# Patient Record
Sex: MALE | Race: WHITE | NOT HISPANIC OR LATINO | Employment: FULL TIME | URBAN - METROPOLITAN AREA
[De-identification: names, ages, dates, MRNs, and addresses within clinical notes are randomized per-mention and may not be internally consistent; named-entity substitution may affect disease eponyms.]

---

## 2023-03-28 DIAGNOSIS — F41.8 SITUATIONAL ANXIETY: Primary | ICD-10-CM

## 2023-03-28 RX ORDER — ESCITALOPRAM OXALATE 10 MG/1
10 TABLET ORAL DAILY
COMMUNITY
End: 2023-03-28 | Stop reason: SDUPTHER

## 2023-03-28 RX ORDER — ESCITALOPRAM OXALATE 10 MG/1
10 TABLET ORAL DAILY
Qty: 30 TABLET | Refills: 3 | Status: SHIPPED | OUTPATIENT
Start: 2023-03-28 | End: 2023-09-01 | Stop reason: SDUPTHER

## 2023-04-05 PROBLEM — K59.09 CHRONIC CONSTIPATION: Status: ACTIVE | Noted: 2023-04-05

## 2023-04-05 PROBLEM — K21.9 ESOPHAGEAL REFLUX: Status: ACTIVE | Noted: 2023-04-05

## 2023-04-05 PROBLEM — R14.0 ABDOMINAL BLOATING: Status: ACTIVE | Noted: 2023-04-05

## 2023-04-05 PROBLEM — R10.13 EPIGASTRIC PAIN: Status: ACTIVE | Noted: 2023-04-05

## 2023-04-05 PROBLEM — J30.9 ALLERGIC RHINITIS: Status: ACTIVE | Noted: 2023-04-05

## 2023-04-05 PROBLEM — A60.00 GENITAL HERPES: Status: ACTIVE | Noted: 2023-04-05

## 2023-04-05 PROBLEM — F41.9 ANXIETY: Status: ACTIVE | Noted: 2023-04-05

## 2023-04-05 PROBLEM — L60.0 INGROWN TOENAIL: Status: ACTIVE | Noted: 2023-04-05

## 2023-04-05 PROBLEM — M25.569 KNEE PAIN: Status: ACTIVE | Noted: 2023-04-05

## 2023-04-05 PROBLEM — I49.1 PAC (PREMATURE ATRIAL CONTRACTION): Status: ACTIVE | Noted: 2023-04-05

## 2023-04-12 ENCOUNTER — OFFICE VISIT (OUTPATIENT)
Dept: PRIMARY CARE | Facility: CLINIC | Age: 23
End: 2023-04-12
Payer: COMMERCIAL

## 2023-04-12 VITALS
RESPIRATION RATE: 16 BRPM | OXYGEN SATURATION: 97 % | HEART RATE: 68 BPM | BODY MASS INDEX: 28.56 KG/M2 | TEMPERATURE: 98 F | WEIGHT: 182 LBS | HEIGHT: 67 IN | SYSTOLIC BLOOD PRESSURE: 118 MMHG | DIASTOLIC BLOOD PRESSURE: 70 MMHG

## 2023-04-12 DIAGNOSIS — A60.01 HERPES SIMPLEX INFECTION OF PENIS: ICD-10-CM

## 2023-04-12 DIAGNOSIS — L98.8 FISTULA: Primary | ICD-10-CM

## 2023-04-12 PROCEDURE — 99395 PREV VISIT EST AGE 18-39: CPT | Performed by: INTERNAL MEDICINE

## 2023-04-12 RX ORDER — OMEPRAZOLE 40 MG/1
40 CAPSULE, DELAYED RELEASE ORAL
COMMUNITY
Start: 2022-09-21 | End: 2023-04-12 | Stop reason: ALTCHOICE

## 2023-04-12 RX ORDER — VALACYCLOVIR HYDROCHLORIDE 500 MG/1
500 TABLET, FILM COATED ORAL 2 TIMES DAILY
Qty: 6 TABLET | Refills: 5 | Status: SHIPPED | OUTPATIENT
Start: 2023-04-12 | End: 2023-04-15

## 2023-04-12 ASSESSMENT — ENCOUNTER SYMPTOMS
CONSTIPATION: 1
SLEEP DISTURBANCE: 0
NERVOUS/ANXIOUS: 1
ABDOMINAL PAIN: 0
SHORTNESS OF BREATH: 0

## 2023-04-12 ASSESSMENT — PATIENT HEALTH QUESTIONNAIRE - PHQ9
1. LITTLE INTEREST OR PLEASURE IN DOING THINGS: NOT AT ALL
SUM OF ALL RESPONSES TO PHQ9 QUESTIONS 1 AND 2: 0
2. FEELING DOWN, DEPRESSED OR HOPELESS: NOT AT ALL

## 2023-04-12 NOTE — PROGRESS NOTES
Patient here for a physical - declined chaperone     Subjective   Patient ID: Ranjit Vaughan is a 23 y.o. male who presents for Annual Exam. He is generally doing well today.    The patient notes that he has been taking escitalopram 10mg every day and believes that may be why he continues to experience occasional anxiety.  He normally takes his medication around 3:30 pm which has been working for him.      The patient mentions experiencing a recent outbreak of genital herpes which resolved after using Femiclear over the counter.  His last episode occurred in 2019.      The patient reports that his diet has improved, and the heartburn has since subsided.  He has discontinued taking the omeprazole 40mg every day.  The patient is following with Yulissa KEARNEY from Gastroenterology.      The patient mentions a small opening near the anus that occasionally produces discharge, and is tender with prolonged sitting.  He is following with Gastroenterology and was advised to try Miralax for occasional gastrointestinal symptoms, and plans to do so soon.     The patient does not smoke cigarettes or use vaping products.    The patient notes mild unilateral hearing impairment, but does not believe that Audiometry is necessary as yet.  He denies any vision changes, and follows up regularly with a Dentist.  He notes good sleep quality.  He denies any dyspnea, abdominal pain, testicular pain, testicular masses, or hernia.          Review of Systems   HENT:  Positive for hearing loss.    Eyes:  Negative for visual disturbance.   Respiratory:  Negative for shortness of breath.    Gastrointestinal:  Positive for constipation. Negative for abdominal pain.        Positive for anal fistula.   Genitourinary:  Negative for scrotal swelling and testicular pain.   Skin:         Positive for genital herpes.   Psychiatric/Behavioral:  Negative for sleep disturbance. The patient is nervous/anxious.      Objective   Physical  Exam  Constitutional:       Appearance: Normal appearance.   Cardiovascular:      Rate and Rhythm: Normal rate and regular rhythm.      Heart sounds: Normal heart sounds.   Pulmonary:      Effort: Pulmonary effort is normal.      Breath sounds: Normal breath sounds.   Abdominal:      General: Bowel sounds are normal.      Palpations: Abdomen is soft.      Tenderness: There is no abdominal tenderness.      Hernia: No hernia is present.   Genitourinary:     Testes: Normal.      Prostate: Normal.   Skin:     General: Skin is warm and dry.   Neurological:      General: No focal deficit present.      Mental Status: He is alert and oriented to person, place, and time. Mental status is at baseline.   Psychiatric:         Mood and Affect: Mood normal.         Behavior: Behavior normal.       Assessment/Plan       CPE Performed  -  Discussed healthy diet and regular exercise.    -  Physical exam overall unremarkable. Immunizations reviewed and updated accordingly. Healthy lifestyle choices discussed (tobacco avoidance, appropriate alcohol use, avoidance of illicit substances).   -  Patient is wearing seatbelt.   -  Screening lab work ordered as indicated.    -  Age appropriate screening tests reviewed with patient.       IMPRESSIONS/PLAN:    Anal Fistula  - Ordered referral to Colorectal Surgery.  Advised patient to take Miralax to prevent constipation and worsening of symptoms in the meantime.    Anxiety   - Mildly symptomatic.  Continue with escitalopram 10mg QD.  Advised patient to take medication consistently for 2 months, and then call the clinic if symptoms persist and will consider increasing dosage.      Herpes Simplex II   - Prescribed Valtrex 1g to be used as directed.  Advised patient to use medication for treatment as Femiclear likely manages symptoms but does not address viral infection.    BP mildly elevated at 118/70 today in office.     Toenail Abnormality   - Previously referred to Dr. Benitez in Podiatry  for further evaluation and treatment.      Reflux   - Resolved. Previously on omeprazole 40mg as needed.  Following with Yulissa KEARNEY from Gastroenterology.     Health Maintenance   - Routine labs 9/2022.       Follow up for annual physical examination, call sooner if needed.        Scribe Attestation  By signing my name below, I, Jaymie Dangelo , [place]   attest that this documentation has been prepared under the direction and in the presence of Mika Olivera DO.

## 2023-09-01 DIAGNOSIS — F41.8 SITUATIONAL ANXIETY: ICD-10-CM

## 2023-09-01 RX ORDER — ESCITALOPRAM OXALATE 10 MG/1
10 TABLET ORAL DAILY
Qty: 30 TABLET | Refills: 11 | Status: SHIPPED | OUTPATIENT
Start: 2023-09-01

## 2023-10-05 ENCOUNTER — PHARMACY VISIT (OUTPATIENT)
Dept: PHARMACY | Facility: CLINIC | Age: 23
End: 2023-10-05
Payer: COMMERCIAL

## 2023-10-05 PROCEDURE — RXMED WILLOW AMBULATORY MEDICATION CHARGE

## 2023-11-13 ENCOUNTER — PHARMACY VISIT (OUTPATIENT)
Dept: PHARMACY | Facility: CLINIC | Age: 23
End: 2023-11-13
Payer: COMMERCIAL

## 2023-11-13 PROCEDURE — RXMED WILLOW AMBULATORY MEDICATION CHARGE

## 2023-11-17 NOTE — PROGRESS NOTES
HPI    Ranjit Vaughan is a 23 y.o. male who was recently seen by his PCP for a small opening near his anus that occasionally drains and is tender.  Was given surgical referral.  Here today with his mother.  Patient reports he has had an area at the superior gluteal cleft that has intermittently drained bloody liquid for the last 5 years.  Drainage has increased to daily occurrence in the last 2-3 months.  Has bloody drainage daily, managed with some toilet paper.  There is occasional pain at the site particularly with prolonged sitting.  No fevers or chills.  No change in bowel function, abdominal pain, nausea, emesis, hematochezia, melena, diarrhea, constipation, dysuria, hematuria.      Vapes/Occasional EtOH intake ~3x per month/No Illicit drug use  No family history of CRC or IBD  PMH: genital herpes, GERD, anxiety,   PSH:  Negative  Employment:  [employer]     PAST MEDICAL HISTORY:  Past Medical History:  05/10/2016: Acute pharyngitis, unspecified      Comment:  Acute pharyngitis, unspecified etiology  05/27/2014: Acute pharyngitis, unspecified      Comment:  Sore throat  02/08/2018: Acute upper respiratory infection, unspecified      Comment:  Acute upper respiratory infection  04/02/2014: Atrial premature depolarization      Comment:  Atrial contractions, premature  No date: Encounter for immunization      Comment:  Encounter for immunization  No date: Other conditions influencing health status      Comment:  No significant past medical history  05/22/2016: Pain in left foot      Comment:  Pain of left heel  04/19/2014: Personal history of other diseases of the respiratory   system      Comment:  History of upper respiratory infection  05/14/2015: Personal history of other diseases of the respiratory   system      Comment:  History of asthma  01/19/2015: Personal history of other diseases of the respiratory   system      Comment:  History of acute sinusitis  09/21/2015: Personal history of  other diseases of the respiratory   system      Comment:  History of acute sinusitis  05/19/2018: Personal history of other diseases of the respiratory   system      Comment:  History of acute pharyngitis  04/19/2014: Personal history of other diseases of the respiratory   system      Comment:  History of acute pharyngitis  02/08/2018: Personal history of other diseases of the respiratory   system      Comment:  History of sore throat  12/05/2016: Strain of other muscle(s) and tendon(s) at lower leg   level, unspecified leg, initial encounter      Comment:  Strain of calf muscle    PAST SURGICAL HISTORY:  No past surgical history on file.    SOCIAL HISTORY:  Social History     Socioeconomic History    Marital status: Single     Spouse name: Not on file    Number of children: Not on file    Years of education: Not on file    Highest education level: Not on file   Occupational History    Not on file   Tobacco Use    Smoking status: Never    Smokeless tobacco: Never   Vaping Use    Vaping Use: Every day    Substances: THC   Substance and Sexual Activity    Alcohol use: Yes     Comment: social    Drug use: Yes     Types: Marijuana    Sexual activity: Not on file   Other Topics Concern    Not on file   Social History Narrative    Not on file     Social Determinants of Health     Financial Resource Strain: Not on file   Food Insecurity: Not on file   Transportation Needs: Not on file   Physical Activity: Not on file   Stress: Not on file   Social Connections: Not on file   Intimate Partner Violence: Not on file   Housing Stability: Not on file        FAMILY HISTORY:  Family History   Problem Relation Name Age of Onset    Drug abuse Mother      Alcohol abuse Mother      Depression Mother      Drug abuse Father      Alcohol abuse Father      Depression Maternal Grandmother      Diabetes Maternal Grandfather      Hyperlipidemia Maternal Grandfather      Hypertension Maternal Grandfather         MEDICATIONS:  Current  Outpatient Medications   Medication Sig Dispense Refill    escitalopram (Lexapro) 10 mg tablet Take 1 tablet (10 mg) by mouth once daily. 30 tablet 11    escitalopram (Lexapro) 10 mg tablet TAKE 1 TABLET (10 MG) BY MOUTH ONCE DAILY. 30 tablet 11     No current facility-administered medications for this visit.     No Known Allergies    Review of Systems   Constitutional:  Negative for activity change, appetite change, chills, fatigue, fever and unexpected weight change.   Respiratory:  Negative for cough, choking, chest tightness, shortness of breath and wheezing.    Cardiovascular:  Negative for chest pain, palpitations and leg swelling.   Gastrointestinal:  Positive for rectal pain. Negative for abdominal distention, abdominal pain, anal bleeding, blood in stool, constipation, diarrhea, nausea and vomiting.   Genitourinary:  Negative for difficulty urinating, dysuria, frequency and hematuria.   Neurological:  Negative for dizziness, weakness and light-headedness.   Psychiatric/Behavioral:  Negative for agitation.        Physical Exam  Constitutional:       General: He is not in acute distress.     Appearance: Normal appearance. He is not ill-appearing.   HENT:      Head: Normocephalic.      Mouth/Throat:      Mouth: Mucous membranes are moist.   Eyes:      Extraocular Movements: Extraocular movements intact.      Pupils: Pupils are equal, round, and reactive to light.   Cardiovascular:      Rate and Rhythm: Normal rate and regular rhythm.      Pulses: Normal pulses.      Heart sounds: Normal heart sounds. No murmur heard.  Pulmonary:      Effort: No respiratory distress.      Breath sounds: No wheezing, rhonchi or rales.   Chest:      Chest wall: No tenderness.   Abdominal:      General: Abdomen is flat. There is no distension.      Palpations: Abdomen is soft. There is no mass.      Tenderness: There is no abdominal tenderness. There is no guarding or rebound.      Hernia: No hernia is present.      Comments:  Pilonidal cyst, gluteal cleft, 3-4 small pits, no surrounding edema/induration/fluctuance/drainage.   Genitourinary:     Rectum: Normal.   Musculoskeletal:         General: No swelling or deformity.      Cervical back: No rigidity.      Right lower leg: No edema.      Left lower leg: No edema.   Skin:     General: Skin is warm.      Coloration: Skin is not jaundiced or pale.   Neurological:      Mental Status: He is alert.         Assessment and Plan:   #Pilonidal cyst  -  Offered excision  -  Overall small, amenable to local flap  -  Prone position in OR  -  Patient will review his schedule and get back to us with a date for surgery; ideally wants to target 1/2024    Carlos Mccallum MD   11/28/2023  6:13 PM

## 2023-11-28 ENCOUNTER — OFFICE VISIT (OUTPATIENT)
Dept: SURGERY | Facility: CLINIC | Age: 23
End: 2023-11-28
Payer: COMMERCIAL

## 2023-11-28 VITALS
BODY MASS INDEX: 31.34 KG/M2 | HEIGHT: 66 IN | WEIGHT: 195 LBS | DIASTOLIC BLOOD PRESSURE: 74 MMHG | HEART RATE: 96 BPM | SYSTOLIC BLOOD PRESSURE: 130 MMHG

## 2023-11-28 DIAGNOSIS — L05.91 PILONIDAL CYST WITHOUT ABSCESS: Primary | ICD-10-CM

## 2023-11-28 PROCEDURE — 99203 OFFICE O/P NEW LOW 30 MIN: CPT | Performed by: STUDENT IN AN ORGANIZED HEALTH CARE EDUCATION/TRAINING PROGRAM

## 2023-11-28 PROCEDURE — 1036F TOBACCO NON-USER: CPT | Performed by: STUDENT IN AN ORGANIZED HEALTH CARE EDUCATION/TRAINING PROGRAM

## 2023-11-28 ASSESSMENT — ENCOUNTER SYMPTOMS
RECTAL PAIN: 1
DIZZINESS: 0
FREQUENCY: 0
ACTIVITY CHANGE: 0
CHEST TIGHTNESS: 0
DIFFICULTY URINATING: 0
COUGH: 0
APPETITE CHANGE: 0
CHOKING: 0
ABDOMINAL DISTENTION: 0
AGITATION: 0
NAUSEA: 0
DIARRHEA: 0
UNEXPECTED WEIGHT CHANGE: 0
CHILLS: 0
WEAKNESS: 0
ABDOMINAL PAIN: 0
VOMITING: 0
BLOOD IN STOOL: 0
SHORTNESS OF BREATH: 0
HEMATURIA: 0
LIGHT-HEADEDNESS: 0
DYSURIA: 0
FATIGUE: 0
FEVER: 0
CONSTIPATION: 0
WHEEZING: 0
ANAL BLEEDING: 0
PALPITATIONS: 0

## 2023-12-27 ENCOUNTER — PHARMACY VISIT (OUTPATIENT)
Dept: PHARMACY | Facility: CLINIC | Age: 23
End: 2023-12-27
Payer: COMMERCIAL

## 2023-12-27 PROCEDURE — RXMED WILLOW AMBULATORY MEDICATION CHARGE

## 2024-02-14 ENCOUNTER — PHARMACY VISIT (OUTPATIENT)
Dept: PHARMACY | Facility: CLINIC | Age: 24
End: 2024-02-14
Payer: COMMERCIAL

## 2024-02-14 PROCEDURE — RXMED WILLOW AMBULATORY MEDICATION CHARGE

## 2024-03-22 PROCEDURE — RXMED WILLOW AMBULATORY MEDICATION CHARGE

## 2024-03-23 ENCOUNTER — PHARMACY VISIT (OUTPATIENT)
Dept: PHARMACY | Facility: CLINIC | Age: 24
End: 2024-03-23
Payer: COMMERCIAL

## 2024-04-01 ENCOUNTER — PHARMACY VISIT (OUTPATIENT)
Dept: PHARMACY | Facility: CLINIC | Age: 24
End: 2024-04-01
Payer: COMMERCIAL

## 2024-04-01 PROCEDURE — RXMED WILLOW AMBULATORY MEDICATION CHARGE

## 2024-05-10 ENCOUNTER — PHARMACY VISIT (OUTPATIENT)
Dept: PHARMACY | Facility: CLINIC | Age: 24
End: 2024-05-10
Payer: COMMERCIAL

## 2024-05-10 PROCEDURE — RXMED WILLOW AMBULATORY MEDICATION CHARGE

## 2024-06-04 ENCOUNTER — TELEPHONE (OUTPATIENT)
Dept: PRIMARY CARE | Facility: CLINIC | Age: 24
End: 2024-06-04
Payer: COMMERCIAL

## 2024-06-04 NOTE — TELEPHONE ENCOUNTER
Pt called wanting to get in to see Dr. Olivera. He stated he was around someone with strep. Went to  was tested for strep but came back neg, no other testing was done. He stated its been occurring  since Saturday, so 4 days. Pt stated he thought maybe it was bronchitis.    Informed Dr. Olivera didn't have anything available rest of the week.     Please call pt at  [phone]

## 2024-06-05 ENCOUNTER — OFFICE VISIT (OUTPATIENT)
Dept: PRIMARY CARE | Facility: CLINIC | Age: 24
End: 2024-06-05
Payer: COMMERCIAL

## 2024-06-05 ENCOUNTER — PHARMACY VISIT (OUTPATIENT)
Dept: PHARMACY | Facility: CLINIC | Age: 24
End: 2024-06-05
Payer: COMMERCIAL

## 2024-06-05 VITALS
WEIGHT: 194 LBS | RESPIRATION RATE: 16 BRPM | BODY MASS INDEX: 31.31 KG/M2 | HEART RATE: 70 BPM | OXYGEN SATURATION: 96 % | DIASTOLIC BLOOD PRESSURE: 70 MMHG | TEMPERATURE: 97 F | SYSTOLIC BLOOD PRESSURE: 128 MMHG

## 2024-06-05 DIAGNOSIS — J32.9 SINUSITIS, UNSPECIFIED CHRONICITY, UNSPECIFIED LOCATION: Primary | ICD-10-CM

## 2024-06-05 PROCEDURE — 1036F TOBACCO NON-USER: CPT | Performed by: INTERNAL MEDICINE

## 2024-06-05 PROCEDURE — 99213 OFFICE O/P EST LOW 20 MIN: CPT | Performed by: INTERNAL MEDICINE

## 2024-06-05 PROCEDURE — RXMED WILLOW AMBULATORY MEDICATION CHARGE

## 2024-06-05 RX ORDER — AMOXICILLIN 875 MG/1
875 TABLET, FILM COATED ORAL 2 TIMES DAILY
Qty: 20 TABLET | Refills: 0 | Status: SHIPPED | OUTPATIENT
Start: 2024-06-05 | End: 2024-06-15

## 2024-06-05 ASSESSMENT — ENCOUNTER SYMPTOMS
SORE THROAT: 1
COUGH: 1

## 2024-06-05 NOTE — PROGRESS NOTES
Patient here for sinus infection - COVID negative     Subjective   Patient ID: Ranjit Vaughan is a 24 y.o. male who presents for URI.    The patient reports that recent onset of sinus pressure, ear fullness, sore throat, and fever of 101oF on 5/31/2024 have subsided this morning.    He took Sudafed over the counter as well as occasional Tylenol, which were helpful.  The patient attended Urgent Care where a Rapid Strep test was negative.  He also tested negative for Covid-19 at home.  He notes mild residual cough.    URI   Associated symptoms include coughing and a sore throat.     Review of Systems   HENT:  Positive for sore throat.    Respiratory:  Positive for cough.        Objective   Physical Exam  Constitutional:       Appearance: Normal appearance.   Neck:      Vascular: No carotid bruit.   Cardiovascular:      Rate and Rhythm: Normal rate and regular rhythm.      Heart sounds: Normal heart sounds.   Pulmonary:      Effort: Pulmonary effort is normal.      Breath sounds: Normal breath sounds.   Abdominal:      General: Bowel sounds are normal.      Palpations: Abdomen is soft.      Tenderness: There is no abdominal tenderness.   Skin:     General: Skin is warm and dry.   Neurological:      General: No focal deficit present.      Mental Status: He is alert and oriented to person, place, and time. Mental status is at baseline.   Psychiatric:         Mood and Affect: Mood normal.         Behavior: Behavior normal.         Assessment/Plan   Problem List Items Addressed This Visit    None  Visit Diagnoses         Codes    Sinusitis, unspecified chronicity, unspecified location    -  Primary J32.9    Relevant Medications    amoxicillin (Amoxil) 875 mg tablet            IMPRESSIONS/PLAN:     Sinusitis  - Significantly improved.  Prescribed amoxicillin 875mg BID to be taken with food to avoid adverse effects to have on hand in case of worsening.  Advised patient to take with yogurt for probiotic effect.  Drink plenty  of water to remain well hydrated, and rest as much as possible.  Call the clinic if symptoms persist or worsen.      /70 today in office.     Anxiety   - Mildly symptomatic.  Continue with escitalopram 10mg QD.  Call the clinic if symptoms persist or worsen.      Toenail Abnormality   - Previously referred to Dr. Benitez in Podiatry for further evaluation and treatment.      Reflux   - Resolved. Previously on omeprazole 40mg as needed.  Following with Yulissa [place]-CNP from Gastroenterology.     Herpes Simplex II   - Previously prescribed Valtrex 1g to be used as directed.  Advised patient to use medication for treatment as Femiclear likely manages symptoms but does not address viral infection.    Anal Fistula  - Advised patient to take Miralax to prevent constipation and worsening of symptoms in the meantime.  Following with  from Colorectal Surgery.    Health Maintenance   - Routine labs 9/2022.  Last TDAP 4/2/2014.     Follow up for annual physical examination, call sooner if needed.        Scribe Attestation  By signing my name below, I, Ahsan Stafford   attest that this documentation has been prepared under the direction and in the presence of Mika Olivera DO.   Jaymie Dangelo 06/05/24 11:22 AM

## 2024-06-26 ENCOUNTER — PHARMACY VISIT (OUTPATIENT)
Dept: PHARMACY | Facility: CLINIC | Age: 24
End: 2024-06-26
Payer: COMMERCIAL

## 2024-06-26 PROCEDURE — RXMED WILLOW AMBULATORY MEDICATION CHARGE

## 2024-07-30 ENCOUNTER — PHARMACY VISIT (OUTPATIENT)
Dept: PHARMACY | Facility: CLINIC | Age: 24
End: 2024-07-30
Payer: COMMERCIAL

## 2024-07-30 PROCEDURE — RXMED WILLOW AMBULATORY MEDICATION CHARGE

## 2024-09-16 DIAGNOSIS — F41.9 ANXIETY: Primary | ICD-10-CM

## 2024-09-16 PROCEDURE — RXMED WILLOW AMBULATORY MEDICATION CHARGE

## 2024-09-16 RX ORDER — ESCITALOPRAM OXALATE 10 MG/1
10 TABLET ORAL
Qty: 30 TABLET | Refills: 11 | Status: SHIPPED | OUTPATIENT
Start: 2024-09-16 | End: 2025-09-16

## 2024-09-19 DIAGNOSIS — F41.9 ANXIETY: ICD-10-CM

## 2024-09-19 RX ORDER — ESCITALOPRAM OXALATE 10 MG/1
10 TABLET ORAL
Qty: 30 TABLET | Refills: 11 | Status: SHIPPED | OUTPATIENT
Start: 2024-09-19 | End: 2025-09-19

## 2024-09-20 ENCOUNTER — PHARMACY VISIT (OUTPATIENT)
Dept: PHARMACY | Facility: CLINIC | Age: 24
End: 2024-09-20
Payer: COMMERCIAL

## 2024-11-11 ENCOUNTER — PHARMACY VISIT (OUTPATIENT)
Dept: PHARMACY | Facility: CLINIC | Age: 24
End: 2024-11-11
Payer: COMMERCIAL

## 2024-11-11 PROCEDURE — RXMED WILLOW AMBULATORY MEDICATION CHARGE

## 2024-12-04 PROCEDURE — RXMED WILLOW AMBULATORY MEDICATION CHARGE

## 2024-12-16 ENCOUNTER — PHARMACY VISIT (OUTPATIENT)
Dept: PHARMACY | Facility: CLINIC | Age: 24
End: 2024-12-16
Payer: COMMERCIAL

## 2025-01-08 PROCEDURE — RXMED WILLOW AMBULATORY MEDICATION CHARGE

## 2025-01-21 ENCOUNTER — PHARMACY VISIT (OUTPATIENT)
Dept: PHARMACY | Facility: CLINIC | Age: 25
End: 2025-01-21
Payer: COMMERCIAL

## 2025-02-13 PROCEDURE — RXMED WILLOW AMBULATORY MEDICATION CHARGE

## 2025-03-03 ENCOUNTER — PHARMACY VISIT (OUTPATIENT)
Dept: PHARMACY | Facility: CLINIC | Age: 25
End: 2025-03-03
Payer: COMMERCIAL

## 2025-03-26 PROCEDURE — RXMED WILLOW AMBULATORY MEDICATION CHARGE

## 2025-04-11 ENCOUNTER — PHARMACY VISIT (OUTPATIENT)
Dept: PHARMACY | Facility: CLINIC | Age: 25
End: 2025-04-11
Payer: COMMERCIAL

## 2025-05-05 PROCEDURE — RXMED WILLOW AMBULATORY MEDICATION CHARGE

## 2025-05-15 ENCOUNTER — PHARMACY VISIT (OUTPATIENT)
Dept: PHARMACY | Facility: CLINIC | Age: 25
End: 2025-05-15
Payer: COMMERCIAL

## 2025-06-30 ENCOUNTER — PHARMACY VISIT (OUTPATIENT)
Dept: PHARMACY | Facility: CLINIC | Age: 25
End: 2025-06-30
Payer: COMMERCIAL

## 2025-06-30 PROCEDURE — RXMED WILLOW AMBULATORY MEDICATION CHARGE

## 2025-08-04 ENCOUNTER — PHARMACY VISIT (OUTPATIENT)
Dept: PHARMACY | Facility: CLINIC | Age: 25
End: 2025-08-04
Payer: COMMERCIAL

## 2025-08-04 PROCEDURE — RXMED WILLOW AMBULATORY MEDICATION CHARGE
